# Patient Record
Sex: FEMALE | Race: WHITE | NOT HISPANIC OR LATINO
[De-identification: names, ages, dates, MRNs, and addresses within clinical notes are randomized per-mention and may not be internally consistent; named-entity substitution may affect disease eponyms.]

---

## 2023-08-16 PROBLEM — Z00.00 ENCOUNTER FOR PREVENTIVE HEALTH EXAMINATION: Status: ACTIVE | Noted: 2023-08-16

## 2023-08-17 ENCOUNTER — APPOINTMENT (OUTPATIENT)
Dept: VASCULAR SURGERY | Facility: CLINIC | Age: 38
End: 2023-08-17
Payer: MEDICAID

## 2023-08-17 ENCOUNTER — NON-APPOINTMENT (OUTPATIENT)
Age: 38
End: 2023-08-17

## 2023-08-17 PROCEDURE — 99203 OFFICE O/P NEW LOW 30 MIN: CPT

## 2023-08-23 NOTE — ADDENDUM
[FreeTextEntry1] : I, Dr. Gentry Haney, personally performed the evaluation and management (E/M) services for this new patient.  That E/M includes conducting the initial examination, assessing all conditions, and establishing the plan of care.  Today, my ACP, Radha Corbin NP, was here to observe my evaluation and management services for this patient to be followed going forward.

## 2023-08-23 NOTE — ASSESSMENT
[Arterial/Venous Disease] : arterial/venous disease [FreeTextEntry1] : 39 y/o F w/ large bilateral varicose veins.  They are symptomatic but the patient seems to waver as to whether or not this pain is severe enough to warrant removal.  I suspect that they will require removal because they do not appear to be in the distribution of the greater saphenous veins.  These veins are unlikely to respond even if incompetency is seen in the greater saphenous veins.  We were unable to obtain a venous duplex scan today but given the above it was not absolutely necessary.    She wants to discuss whether or not she should proceed with surgery with her .  If they decide I told her to let us know and we would make plans accordingly.

## 2023-08-23 NOTE — PHYSICAL EXAM
[Respiratory Effort] : normal respiratory effort [No Rash or Lesion] : No rash or lesion [Alert] : alert [Oriented to Person] : oriented to person [Oriented to Place] : oriented to place [Oriented to Time] : oriented to time [Calm] : calm [Varicose Veins Of Lower Extremities] : bilaterally [Ankle Swelling Bilaterally] : severe [Ankle Swelling (On Exam)] : not present [de-identified] : WN/WD [de-identified] : clear [de-identified] : clear [FreeTextEntry1] : Bilateral upper thigh bulging venous varices.  On both sides the veins across the anterior aspect of the thighs and along the lateral aspect of the thighs and the calves.  Not in the distribution of the greater saphenous veins.  No significant edema. [de-identified] : FROM

## 2023-08-23 NOTE — HISTORY OF PRESENT ILLNESS
[FreeTextEntry1] : 37 y/o F referred by [facility]. She has a PMHX of very painful bilateral varicose veins, especially during pregnancy.  She denies episodes of phlebitis or deep venous thrombosis.  She has no past medical history.  She has had 7 children and today is here in the office with her youngest child who is 8 months old.  She is here by herself with the baby.